# Patient Record
Sex: MALE | Race: WHITE | Employment: FULL TIME | URBAN - METROPOLITAN AREA
[De-identification: names, ages, dates, MRNs, and addresses within clinical notes are randomized per-mention and may not be internally consistent; named-entity substitution may affect disease eponyms.]

---

## 2018-06-25 LAB
CREATININE, EXTERNAL: 1.15
HBA1C MFR BLD HPLC: 5.5 %
LDL-C, EXTERNAL: 157

## 2018-10-01 ENCOUNTER — OFFICE VISIT (OUTPATIENT)
Dept: ENDOCRINOLOGY | Age: 28
End: 2018-10-01

## 2018-10-01 VITALS
WEIGHT: 195.8 LBS | HEIGHT: 67 IN | RESPIRATION RATE: 16 BRPM | BODY MASS INDEX: 30.73 KG/M2 | HEART RATE: 71 BPM | OXYGEN SATURATION: 97 % | SYSTOLIC BLOOD PRESSURE: 128 MMHG | DIASTOLIC BLOOD PRESSURE: 70 MMHG

## 2018-10-01 DIAGNOSIS — E78.00 PURE HYPERCHOLESTEROLEMIA: ICD-10-CM

## 2018-10-01 DIAGNOSIS — E78.49 FAMILIAL HYPERLIPIDEMIA, HIGH LDL: Primary | ICD-10-CM

## 2018-10-01 DIAGNOSIS — R74.01 ELEVATED ALANINE AMINOTRANSFERASE (ALT) LEVEL: ICD-10-CM

## 2018-10-01 RX ORDER — ALBUTEROL SULFATE 90 UG/1
AEROSOL, METERED RESPIRATORY (INHALATION)
COMMUNITY

## 2018-10-01 RX ORDER — MONTELUKAST SODIUM 10 MG/1
TABLET ORAL
COMMUNITY

## 2018-10-01 RX ORDER — ATORVASTATIN CALCIUM 40 MG/1
TABLET, FILM COATED ORAL
COMMUNITY
Start: 2018-05-09 | End: 2018-10-01 | Stop reason: SDUPTHER

## 2018-10-01 RX ORDER — EZETIMIBE 10 MG/1
TABLET ORAL
COMMUNITY
Start: 2018-08-27 | End: 2018-10-01 | Stop reason: SDUPTHER

## 2018-10-01 RX ORDER — BISMUTH SUBSALICYLATE 262 MG
1 TABLET,CHEWABLE ORAL DAILY
COMMUNITY

## 2018-10-01 RX ORDER — ATORVASTATIN CALCIUM 80 MG/1
80 TABLET, FILM COATED ORAL DAILY
Qty: 90 TAB | Refills: 2 | Status: SHIPPED | OUTPATIENT
Start: 2018-10-01 | End: 2019-04-17 | Stop reason: SDUPTHER

## 2018-10-01 RX ORDER — NIACIN 1000 MG
TABLET, EXTENDED RELEASE ORAL
COMMUNITY

## 2018-10-01 RX ORDER — EZETIMIBE 10 MG/1
10 TABLET ORAL DAILY
Qty: 90 TAB | Refills: 3 | Status: SHIPPED | OUTPATIENT
Start: 2018-10-01 | End: 2019-04-17 | Stop reason: SDUPTHER

## 2018-10-01 NOTE — PROGRESS NOTES
Endocrinology New Patient Visit    Chief Complaint: hyperlipidemia    History of Present Illness:  Pradip Decker is a 33 y.o. male who is self-referred for familial hyperlipidemia (type unknown). He was previously f/b Dr Ted Georges at [facility], last visit was ~5 years ago. He was unable to continue to commute to [facility] for appointments, lives closer to [place]. We are working to obtain records regarding his diagnosis and testing from Dr Aashish Silva. He denies any clinical manifestations of hyperlipidemia (xanthomas, xanthelasmas). He has a strong family history of high LDL cholesterol and premature CAD. His father had an MI and CABG at age 35, and recently underwent revision of the CABG a couple years ago (now in his early 62s). The patient does not know his initial lipid profile prior to starting treatment for high cholesterol. His current regimen is atorvastatin 40mg daily, Zetia 10mg daily, and regular niacin 1000mg daily. He was previously taking Zocor but has been on atorvastatin for 10 years. He was on 80mg daily of atorvastatin and denies side effects, but his dose was reduced by Dr Zendejas January when he started niacin. Most recent labs done in June (while on current medications) are notable for the following results: Total Chol 213  TG 37  HDL 49    TSH 1.3  ALT 47 (slightly elevated) - AST normal at 27  A1c 5.5%    He is interested in other therapies for high cholesterol including Repatha, which his father is taking. He denies any h/o CAD or CVA. Has not had a coronary calcium score or liver US. Overall feels well and has no complaints today. Able to tolerate exercise without CP or JARAMILLO. Denies myalgias but does have flushing on occasion from niacin. Works for [employer], lives in [facility]. His wife is an NP. Review of Systems: as above, otherwise a 10 pt review is negative     Problem List:  There is no problem list on file for this patient.       Past Medical History:  Past Medical History: Diagnosis Date    Hyperlipidemia        Past Surgical History:  History reviewed. No pertinent surgical history. Social History:  Social History     Social History    Marital status:      Spouse name: N/A    Number of children: N/A    Years of education: N/A     Occupational History    Not on file. Social History Main Topics    Smoking status: Never Smoker    Smokeless tobacco: Never Used    Alcohol use 1.2 oz/week     2 Glasses of wine per week      Comment: rare    Drug use: Not on file    Sexual activity: Not on file     Other Topics Concern    Not on file     Social History Narrative    No narrative on file       Family History:  No family history on file. Medications:     Current Outpatient Prescriptions:     atorvastatin (LIPITOR) 40 mg tablet, , Disp: , Rfl:     ezetimibe (ZETIA) 10 mg tablet, , Disp: , Rfl:     montelukast (SINGULAIR) 10 mg tablet, montelukast 10 mg tablet, Disp: , Rfl:     docosahexanoic acid/epa (FISH OIL PO), Take  by mouth., Disp: , Rfl:     ubidecarenone (ULTRA COQ10 PO), Take  by mouth., Disp: , Rfl:     multivitamin (ONE A DAY) tablet, Take 1 Tab by mouth daily. , Disp: , Rfl:     BABY ASPIRIN PO, Take  by mouth., Disp: , Rfl:     niacin 1,000 mg TbER, Take  by mouth., Disp: , Rfl:     albuterol (PROVENTIL HFA, VENTOLIN HFA, PROAIR HFA) 90 mcg/actuation inhaler, Ventolin HFA 90 mcg/actuation aerosol inhaler, Disp: , Rfl:     Allergies:  No Known Allergies    Physical Examination:  Visit Vitals    /70    Pulse 71    Resp 16    Ht 5' 7\" (1.702 m)    Wt 195 lb 12.8 oz (88.8 kg)    SpO2 97%    BMI 30.67 kg/m2       Gen: no acute distress  HEENT: mucous membranes moist, no corneal abnormalities  Thyroid: no enlargement or nodules noted  CAD: normal rate, regular rhythm.  No murmur rubs or gallops  PULM: clear to ausculation, no wheezes, rhonchis or rales  EXT: no clubbing, cyanosis or edema, no tendon xanthomas   Neuro: grossly non focal, normal DTRs  Psych: pleasant, good insight into medical hx    Clinical Data Review: There are no results available in [facility]. Pertinent lab results from outside records are transcribed in HPI and scanned into the medical record. Assessment and Plan:  Basilio Decker is a 33 y.o. male here for familial hyperlipidemia, type unknown - awaiting [facility] records to help clarify. He does not have known CVD, but we may pursue coronary calcium testing in the future. We discussed that his LDL goal is at least below 150 (but ideally less than 100) for now. Will first maximize his atorvastatin dose to 80mg daily. Continue Zetia, ASA, and niacin for now. If LDL remains above 100, will recommend addition of Repatha (which would enable us to d/c niacin). Plan to monitor LFTs given his recent elevation of ALT. Since this is commonly seen in NAFLD, I am also ordering a liver ultrasound to evaluate for that. Patient verbalized an understanding and will return to clinic in 6 months. He will have CMP/lipids done Q3mo. I spent 45 minutes with the patient today and > 50% of the time was spent counseling the patient about causes, manifestations, and treatment of familial hyperlipidemia. Thank you for the opportunity to participate in this patient's care.     Khushboo Fontenot MD  [facility]  [facility]

## 2018-10-01 NOTE — MR AVS SNAPSHOT
[address] [facility] [address] [place] [address] 
[phone] Patient: Radha Cueavs Page MRN: [id number] :1990 Visit Information Date & Time Provider Department Dept. Phone Encounter #  
 10/1/2018  1:30 PM MD Harish Gonzalez[facility] [phone] Upcoming Health Maintenance Date Due DTaP/Tdap/Td series (1 - Tdap) 2010 Influenza Age 5 to Adult 2018 Allergies as of 10/1/2018  Review Complete On: 10/1/2018 By: Girma Rosenberg No Known Allergies Current Immunizations  Never Reviewed No immunizations on file. Not reviewed this visit You Were Diagnosed With   
  
 Codes Comments Pure hypercholesterolemia    -  Primary ICD-10-CM: E78.00 ICD-9-CM: 272.0 Vitals BP Pulse Resp Height(growth percentile) Weight(growth percentile) SpO2  
 128/70 71 16 5' 7\" (1.702 m) 195 lb 12.8 oz (88.8 kg) 97% BMI Smoking Status 30.67 kg/m2 Never Smoker Vitals History BMI and BSA Data Body Mass Index Body Surface Area  
 30.67 kg/m 2 2.05 m 2 Preferred Pharmacy Pharmacy Name Phone [address] [place] [address] [phone] Your Updated Medication List  
  
   
This list is accurate as of 10/1/18  2:12 PM.  Always use your most recent med list.  
  
  
  
  
 albuterol 90 mcg/actuation inhaler Commonly known as:  PROVENTIL HFA, VENTOLIN HFA, PROAIR HFA Ventolin HFA 90 mcg/actuation aerosol inhaler  
  
 atorvastatin 80 mg tablet Commonly known as:  LIPITOR Take 1 Tab by mouth daily. BABY ASPIRIN PO Take  by mouth.  
  
 ezetimibe 10 mg tablet Commonly known as:  Orpah Dux FISH OIL PO Take  by mouth.  
  
 montelukast 10 mg tablet Commonly known as:  SINGULAIR  
montelukast 10 mg tablet  
  
 multivitamin tablet Commonly known as:  ONE A DAY  
 Take 1 Tab by mouth daily. niacin 1,000 mg Tber Take  by mouth. ULTRA COQ10 PO Take  by mouth. Prescriptions Sent to Pharmacy Refills  
 atorvastatin (LIPITOR) 80 mg tablet 2 Sig: Take 1 Tab by mouth daily. Class: Normal  
 Pharmacy: [address] [place] [address] [facility] [address]  #: [phone] Route: Oral  
  
We Performed the Following LIPID PANEL [68408 CPT(R)] METABOLIC PANEL, COMPREHENSIVE [78212 CPT(R)] To-Do List   
 Around 10/01/2018 Imaging:  US ABD LTD   
  
 10/11/2018 2:30 PM  
  Appointment with Manjinder Escobedo MD; [facility] FLUORO 2 at [address] ([phone]) 1. Blood thinners and platelet inhibitors must be stopped 3-5 days prior to procedure. Consult your ordering physician prior to stopping them. 2. Arrive 30 minutes prior to schedued exam time. 10/11/2018 3:00 PM  
  Appointment with [facility] MRI 1 at [facility] ([phone]) 1. Please bring a list or a bag of your current medications to your appointment 2. Please be sure to remove ALL hair clips, pins, extensions, etc., prior to arriving for your MRI procedure. 3. If you have any medical implants or devices, please bring associated medical card with you. 4. Bring any non [facility] films or CDs pertaining to the area being imaged with you on the day of appointment. 5. A written order with a valid diagnosis and Physicians  signature is required for all scheduled tests. 6. Check in at registration 30min before your appointment time unless you were instructed to do otherwise. Around 04/01/2019 Lab:  METABOLIC PANEL, COMPREHENSIVE Around 04/01/2019 Lab:  Kennedy Hodges Introducing [facility]! [facility] [employer] introduces My Pick Box patient portal. Now you can access parts of your medical record, email your doctor's office, and request medication refills online.    
 
1. In your internet browser, go to [email or web address] 2. Click on the First Time User? Click Here link in the Sign In box. You will see the New Member Sign Up page. 3. Enter your [employer] Access Code exactly as it appears below. You will not need to use this code after youve completed the sign-up process. If you do not sign up before the expiration date, you must request a new code. · [employer] Access Code: [email or web address] Expires: 11/5/2018  4:21 PM 
 
4. Enter the last four digits of your Social Security Number (xxxx) and Date of Birth (mm/dd/yyyy) as indicated and click Submit. You will be taken to the next sign-up page. 5. Create a Talkot ID. This will be your [employer] login ID and cannot be changed, so think of one that is secure and easy to remember. 6. Create a [employer] password. You can change your password at any time. 7. Enter your Password Reset Question and Answer. This can be used at a later time if you forget your password. 8. Enter your e-mail address. You will receive e-mail notification when new information is available in [address] 9. Click Sign Up. You can now view and download portions of your medical record. 10. Click the Download Summary menu link to download a portable copy of your medical information. If you have questions, please visit the Frequently Asked Questions section of the [employer] website. Remember, [employer] is NOT to be used for urgent needs. For medical emergencies, dial 911. Now available from your iPhone and Android! Please provide this summary of care documentation to your next provider. If you have any questions after today's visit, please call [phone].

## 2018-10-02 PROBLEM — E78.49 FAMILIAL HYPERLIPIDEMIA, HIGH LDL: Status: ACTIVE | Noted: 2018-10-02

## 2018-10-02 PROBLEM — R74.01 ELEVATED ALANINE AMINOTRANSFERASE (ALT) LEVEL: Status: ACTIVE | Noted: 2018-10-02

## 2018-10-04 ENCOUNTER — HOSPITAL ENCOUNTER (OUTPATIENT)
Dept: ULTRASOUND IMAGING | Age: 28
Discharge: HOME | End: 2018-10-04
Attending: INTERNAL MEDICINE
Payer: COMMERCIAL

## 2018-10-04 DIAGNOSIS — E78.00 PURE HYPERCHOLESTEROLEMIA: ICD-10-CM

## 2018-10-04 PROCEDURE — 76705 ECHO EXAM OF ABDOMEN: CPT

## 2018-10-04 NOTE — PROGRESS NOTES
Please let him know that his ultrasound was normal. No evidence of fatty liver. He can see the report if he signs up for MyChart.

## 2018-10-05 ENCOUNTER — TELEPHONE (OUTPATIENT)
Dept: ENDOCRINOLOGY | Age: 28
End: 2018-10-05

## 2018-10-05 NOTE — TELEPHONE ENCOUNTER
----- Message from Letitia Chaudhary MD sent at 10/4/2018  3:13 PM EDT -----  Please let him know that his ultrasound was normal. No evidence of fatty liver. He can see the report if he signs up for MyChart.

## 2018-10-05 NOTE — TELEPHONE ENCOUNTER
Pt was made aware of his normal ultrasound report after verifying his name and .   Pt voiced understanding

## 2018-10-11 ENCOUNTER — HOSPITAL ENCOUNTER (OUTPATIENT)
Dept: GENERAL RADIOLOGY | Age: 28
Discharge: HOME | End: 2018-10-11
Attending: ORTHOPAEDIC SURGERY
Payer: COMMERCIAL

## 2018-10-11 ENCOUNTER — HOSPITAL ENCOUNTER (OUTPATIENT)
Dept: MRI IMAGING | Age: 28
Discharge: HOME | End: 2018-10-11
Attending: ORTHOPAEDIC SURGERY
Payer: COMMERCIAL

## 2018-10-11 DIAGNOSIS — M75.102 ROTATOR CUFF SYNDROME OF LEFT SHOULDER: ICD-10-CM

## 2018-10-11 PROCEDURE — 74011636320 HC RX REV CODE- 636/320: Performed by: RADIOLOGY

## 2018-10-11 PROCEDURE — 23350 INJECTION FOR SHOULDER X-RAY: CPT

## 2018-10-11 PROCEDURE — A9575 INJ GADOTERATE MEGLUMI 0.1ML: HCPCS | Performed by: RADIOLOGY

## 2018-10-11 PROCEDURE — 74011250636 HC RX REV CODE- 250/636: Performed by: RADIOLOGY

## 2018-10-11 PROCEDURE — 77030014113 HC TY ARTHROGRM BD -A

## 2018-10-11 PROCEDURE — 73222 MRI JOINT UPR EXTREM W/DYE: CPT

## 2018-10-11 PROCEDURE — 77030003666 HC NDL SPINAL BD -A

## 2018-10-11 RX ORDER — LIDOCAINE HYDROCHLORIDE 10 MG/ML
10 INJECTION, SOLUTION EPIDURAL; INFILTRATION; INTRACAUDAL; PERINEURAL ONCE
Status: COMPLETED | OUTPATIENT
Start: 2018-10-11 | End: 2018-10-11

## 2018-10-11 RX ORDER — GADOTERATE MEGLUMINE 376.9 MG/ML
2 INJECTION INTRAVENOUS
Status: COMPLETED | OUTPATIENT
Start: 2018-10-11 | End: 2018-10-11

## 2018-10-11 RX ADMIN — LIDOCAINE HYDROCHLORIDE 5 ML: 10 INJECTION, SOLUTION EPIDURAL; INFILTRATION; INTRACAUDAL; PERINEURAL at 15:07

## 2018-10-11 RX ADMIN — IOHEXOL 10 ML: 300 INJECTION, SOLUTION INTRAVENOUS at 15:06

## 2018-10-11 RX ADMIN — GADOTERATE MEGLUMINE 2 ML: 376.9 INJECTION INTRAVENOUS at 15:06

## 2019-04-01 DIAGNOSIS — E78.00 PURE HYPERCHOLESTEROLEMIA: ICD-10-CM

## 2019-04-17 ENCOUNTER — OFFICE VISIT (OUTPATIENT)
Dept: ENDOCRINOLOGY | Age: 29
End: 2019-04-17

## 2019-04-17 VITALS
OXYGEN SATURATION: 93 % | RESPIRATION RATE: 16 BRPM | HEIGHT: 67 IN | DIASTOLIC BLOOD PRESSURE: 76 MMHG | WEIGHT: 199.2 LBS | SYSTOLIC BLOOD PRESSURE: 126 MMHG | BODY MASS INDEX: 31.27 KG/M2 | HEART RATE: 74 BPM

## 2019-04-17 DIAGNOSIS — E78.00 PURE HYPERCHOLESTEROLEMIA: Primary | ICD-10-CM

## 2019-04-17 RX ORDER — ATORVASTATIN CALCIUM 80 MG/1
80 TABLET, FILM COATED ORAL DAILY
Qty: 90 TAB | Refills: 3 | Status: SHIPPED | OUTPATIENT
Start: 2019-04-17

## 2019-04-17 RX ORDER — EZETIMIBE 10 MG/1
10 TABLET ORAL DAILY
Qty: 90 TAB | Refills: 3 | Status: SHIPPED | OUTPATIENT
Start: 2019-04-17

## 2019-04-17 NOTE — PROGRESS NOTES
Endocrinology Visit    Chief Complaint: hyperlipidemia    History of Present Illness:  Lillian Decker is a 26 y.o. male who is returns for familial hyperlipidemia (type unknown). He was previously f/b Dr Diana Reid at [facility], last visit was ~5 years ago. He was unable to continue to commute to [facility] for appointments, lives closer to [place]. I saw him in initial consultation in October at which time I increased his atorvastatin from 40 to 80mg daily and discussed possibly adding Repatha depending on his 6 month lipid results. He has not done the bloodwork yet. In the interim he reports tolerating the atorvastatin 80mg dose and continues taking Zetia 10mg daily and regular niacin 1000mg daily. Denies myalgias but does have flushing on occasion from niacin. He denies any clinical manifestations of hyperlipidemia (xanthomas, xanthelasmas). He has a strong family history of high LDL cholesterol and premature CAD. His father had an MI and CABG at age 35, and recently underwent revision of the CABG a couple years ago (now in his early 62s). The patient does not know his initial lipid profile prior to starting treatment for high cholesterol. I do not have his [facility] records yet, although we requested them at his last visit. He was previously taking Zocor but has been on atorvastatin for 10 years. Most recent labs done in June (while on atorva 40, zetia, and niacin) are notable for the following results: Total Chol 213  TG 37  HDL 49    TSH 1.3  ALT 47 (slightly elevated) - AST normal at 27  A1c 5.5%    He is interested in other therapies for high cholesterol including Repatha, which his father is taking. He denies any h/o CAD or CVA. Has not had a coronary calcium score but I ordered a liver US at his last visit and this was normal. Overall feels well and has no complaints today. Able to tolerate exercise without CP or JARAMILLO. Works for [employer], lives in [place] [address]. His wife is an NP.     Review of Systems: as above, otherwise a 7 pt review is negative     Problem List:  Patient Active Problem List   Diagnosis Code    Pure hypercholesterolemia E78.00    Familial hyperlipidemia, high LDL E78.49    Elevated alanine aminotransferase (ALT) level R74.0       Past Medical History:    Past Medical History:   Diagnosis Date    Hyperlipidemia        Past Surgical History:  No past surgical history on file. Social History:  Social History     Socioeconomic History    Marital status:      Spouse name: Not on file    Number of children: Not on file    Years of education: Not on file    Highest education level: Not on file   Occupational History    Not on file   Social Needs    Financial resource strain: Not on file    Food insecurity:     Worry: Not on file     Inability: Not on file    Transportation needs:     Medical: Not on file     Non-medical: Not on file   Tobacco Use    Smoking status: Never Smoker    Smokeless tobacco: Never Used   Substance and Sexual Activity    Alcohol use: Yes     Alcohol/week: 1.2 oz     Types: 2 Glasses of wine per week     Comment: rare    Drug use: Not on file    Sexual activity: Not on file   Lifestyle    Physical activity:     Days per week: Not on file     Minutes per session: Not on file    Stress: Not on file   Relationships    Social connections:     Talks on phone: Not on file     Gets together: Not on file     Attends Uatsdin service: Not on file     Active member of club or organization: Not on file     Attends meetings of clubs or organizations: Not on file     Relationship status: Not on file    Intimate partner violence:     Fear of current or ex partner: Not on file     Emotionally abused: Not on file     Physically abused: Not on file     Forced sexual activity: Not on file   Other Topics Concern    Not on file   Social History Narrative    Not on file       Family History:  No family history on file.     Medications:     Current Outpatient Medications:     montelukast (SINGULAIR) 10 mg tablet, montelukast 10 mg tablet, Disp: , Rfl:     docosahexanoic acid/epa (FISH OIL PO), Take  by mouth., Disp: , Rfl:     ubidecarenone (ULTRA COQ10 PO), Take  by mouth., Disp: , Rfl:     multivitamin (ONE A DAY) tablet, Take 1 Tab by mouth daily. , Disp: , Rfl:     BABY ASPIRIN PO, Take  by mouth., Disp: , Rfl:     atorvastatin (LIPITOR) 80 mg tablet, Take 1 Tab by mouth daily. , Disp: 90 Tab, Rfl: 2    ezetimibe (ZETIA) 10 mg tablet, Take 1 Tab by mouth daily. , Disp: 90 Tab, Rfl: 3    albuterol (PROVENTIL HFA, VENTOLIN HFA, PROAIR HFA) 90 mcg/actuation inhaler, Ventolin HFA 90 mcg/actuation aerosol inhaler, Disp: , Rfl:     niacin 1,000 mg TbER, Take  by mouth., Disp: , Rfl:     Allergies:  No Known Allergies    Physical Examination:  Visit Vitals  /76   Pulse 74   Resp 16   Ht 5' 7\" (1.702 m)   Wt 199 lb 3.2 oz (90.4 kg)   SpO2 93%   BMI 31.20 kg/m²       Gen: no acute distress  HEENT: mucous membranes moist, no corneal abnormalities  Thyroid: no enlargement or nodules noted  CAD: normal rate, regular rhythm. No murmur rubs or gallops  PULM: clear to ausculation, no wheezes, rhonchis or rales  EXT: no clubbing, cyanosis or edema, no tendon xanthomas   Neuro: grossly non focal, normal DTRs  Psych: pleasant, good insight into medical hx    Abdominal US 2018  FINDINGS:   Limited right upper quadrant images of the abdomen were obtained using a curved  array transducer. RIGHT KIDNEY: measures 10.3 cm is [normal] in echogenicity with normal  corticomedullary differentiation. There is no hydronephrosis or large echogenic  calculus demonstrated. GALLBLADDER: Normal.  COMMON BILE DUCT: measures 3 mm. LIVER: Normal in echogenicity. No mass lesion is identified. Portal venous flow within normal limits. The visualized portions of the pancreas are unremarkable.      IMPRESSION:   Unremarkable right upper quadrant abdominal ultrasound.     Assessment and Plan:  Carola Decker is a 26 y.o. male here for familial hyperlipidemia, type unknown - awaiting [facility] records to help clarify. He does not have known CVD, but we may pursue coronary calcium testing in the future. On atorva 40mg daily, LDL goal was at least below 150, but I maximized his atorvastatin dose to 80mg daily to help decrease this further. Continue atorvastatin 80mg daily, Zetia, ASA, and niacin for now. Recheck fasting lipids today. If LDL remains above 100, consider addition of Repatha (which would enable us to d/c niacin). Repeat LFTs given his recent elevation of ALT. Reviewed his liver ultrasound which was normal.    Patient verbalized an understanding and will return to clinic in 6 months. I spent 15 minutes with the patient today and > 50% of the time was spent counseling the patient about causes, manifestations, and treatment of familial hyperlipidemia. Thank you for the opportunity to participate in this patient's care.     Tracee Krause MD  [facility]  [facility]

## 2019-04-21 LAB
ALBUMIN SERPL-MCNC: 4.7 G/DL (ref 3.5–5.5)
ALBUMIN/GLOB SERPL: 2 {RATIO} (ref 1.2–2.2)
ALP SERPL-CCNC: 81 IU/L (ref 39–117)
ALT SERPL-CCNC: 78 IU/L (ref 0–44)
AST SERPL-CCNC: 33 IU/L (ref 0–40)
BILIRUB SERPL-MCNC: 0.6 MG/DL (ref 0–1.2)
BUN SERPL-MCNC: 17 MG/DL (ref 6–20)
BUN/CREAT SERPL: 15 (ref 9–20)
CALCIUM SERPL-MCNC: 9.7 MG/DL (ref 8.7–10.2)
CHLORIDE SERPL-SCNC: 103 MMOL/L (ref 96–106)
CHOLEST SERPL-MCNC: 207 MG/DL (ref 100–199)
CO2 SERPL-SCNC: 24 MMOL/L (ref 20–29)
CREAT SERPL-MCNC: 1.13 MG/DL (ref 0.76–1.27)
GLOBULIN SER CALC-MCNC: 2.3 G/DL (ref 1.5–4.5)
GLUCOSE SERPL-MCNC: 86 MG/DL (ref 65–99)
HDLC SERPL-MCNC: 45 MG/DL
LDLC SERPL CALC-MCNC: 149 MG/DL (ref 0–99)
POTASSIUM SERPL-SCNC: 5.1 MMOL/L (ref 3.5–5.2)
PROT SERPL-MCNC: 7 G/DL (ref 6–8.5)
SODIUM SERPL-SCNC: 144 MMOL/L (ref 134–144)
TRIGL SERPL-MCNC: 66 MG/DL (ref 0–149)
VLDLC SERPL CALC-MCNC: 13 MG/DL (ref 5–40)

## 2019-04-26 ENCOUNTER — TELEPHONE (OUTPATIENT)
Dept: ENDOCRINOLOGY | Age: 29
End: 2019-04-26

## 2019-04-26 NOTE — TELEPHONE ENCOUNTER
You can share his results with him. I tried to send them to him on Ahaalihart but he has not signed up. I will send a letter.

## 2020-07-27 ENCOUNTER — TELEPHONE (OUTPATIENT)
Dept: ENT CLINIC | Age: 30
End: 2020-07-27

## 2020-08-18 ENCOUNTER — TELEPHONE (OUTPATIENT)
Dept: ENT CLINIC | Age: 30
End: 2020-08-18

## 2020-08-26 ENCOUNTER — TELEPHONE (OUTPATIENT)
Dept: ENT CLINIC | Age: 30
End: 2020-08-26

## 2020-08-26 NOTE — TELEPHONE ENCOUNTER
Left detailed msg on his vmail.   I have spoken to the clinic in [place] and was not totally clear on what they needed from me

## 2020-11-13 ENCOUNTER — TELEPHONE (OUTPATIENT)
Dept: ENT CLINIC | Age: 30
End: 2020-11-13

## 2020-11-14 PROBLEM — J34.3 HYPERTROPHY OF NASAL TURBINATES: Status: ACTIVE | Noted: 2020-11-14

## 2020-11-14 PROBLEM — G47.33 OBSTRUCTIVE SLEEP APNEA SYNDROME: Status: ACTIVE | Noted: 2020-11-14

## 2020-11-14 PROBLEM — J30.1 ALLERGIC RHINITIS DUE TO POLLEN: Status: ACTIVE | Noted: 2020-11-14

## 2020-11-14 RX ORDER — DICLOFENAC SODIUM 75 MG/1
TABLET, DELAYED RELEASE ORAL
COMMUNITY

## 2020-11-14 RX ORDER — EPINEPHRINE 0.15 MG/.15ML
0.15 INJECTION SUBCUTANEOUS
COMMUNITY
End: 2021-04-23 | Stop reason: SDUPTHER

## 2020-11-14 RX ORDER — IBUPROFEN 800 MG/1
TABLET ORAL
COMMUNITY

## 2020-11-14 RX ORDER — CEFDINIR 300 MG/1
300 CAPSULE ORAL 2 TIMES DAILY
COMMUNITY
End: 2022-08-03 | Stop reason: ALTCHOICE

## 2020-11-14 RX ORDER — MELOXICAM 15 MG/1
15 TABLET ORAL DAILY
COMMUNITY

## 2020-11-14 RX ORDER — HYDROCODONE BITARTRATE AND ACETAMINOPHEN 5; 325 MG/1; MG/1
TABLET ORAL
COMMUNITY

## 2020-11-16 ENCOUNTER — TELEPHONE (OUTPATIENT)
Dept: ENT CLINIC | Age: 30
End: 2020-11-16

## 2021-01-04 ENCOUNTER — TELEPHONE (OUTPATIENT)
Dept: ENT CLINIC | Age: 31
End: 2021-01-04

## 2021-01-06 NOTE — TELEPHONE ENCOUNTER
Can you see when this pt is back in town and available and I can get him an appt sooner.   Pt. Needs to F/U with this issue and immunotherapy

## 2021-02-03 ENCOUNTER — OFFICE VISIT (OUTPATIENT)
Dept: ENT CLINIC | Age: 31
End: 2021-02-03
Payer: COMMERCIAL

## 2021-02-03 ENCOUNTER — TELEPHONE (OUTPATIENT)
Dept: ENT CLINIC | Age: 31
End: 2021-02-03

## 2021-02-03 VITALS
SYSTOLIC BLOOD PRESSURE: 122 MMHG | TEMPERATURE: 98.2 F | RESPIRATION RATE: 18 BRPM | HEART RATE: 74 BPM | HEIGHT: 67 IN | DIASTOLIC BLOOD PRESSURE: 82 MMHG | OXYGEN SATURATION: 100 % | BODY MASS INDEX: 28.72 KG/M2 | WEIGHT: 183 LBS

## 2021-02-03 DIAGNOSIS — J30.9 ALLERGIC RHINITIS, UNSPECIFIED SEASONALITY, UNSPECIFIED TRIGGER: Primary | ICD-10-CM

## 2021-02-03 DIAGNOSIS — J34.2 DNS (DEVIATED NASAL SEPTUM): ICD-10-CM

## 2021-02-03 PROCEDURE — 99214 OFFICE O/P EST MOD 30 MIN: CPT | Performed by: OTOLARYNGOLOGY

## 2021-02-03 NOTE — PROGRESS NOTES
Subjective:    Jerry Se Page   31 y.o.   1990     Followup Visit    Location - nose, sinus    Quality - allergic rhinitis    Severity -  Mild/moderate    Duration - years    Timing - chronic    Context - pt following up on IT; had some breaks while away in [place], but has been doing well overall not taking much antihistamine; he does complain of having occ folliculitis in nares, using bacitracin    Modifying Features - shots helping    Associated symptoms/signs - as above      Review of Systems  Review of Systems   Constitutional: Negative for chills and fever. HENT: Positive for congestion. Negative for ear pain, hearing loss, nosebleeds and tinnitus. Eyes: Negative for blurred vision and double vision. Respiratory: Negative for cough, sputum production and shortness of breath. Cardiovascular: Negative for chest pain and palpitations. Gastrointestinal: Negative for heartburn, nausea and vomiting. Musculoskeletal: Negative for joint pain and neck pain. Skin: Negative. Neurological: Negative for dizziness, speech change, weakness and headaches. Endo/Heme/Allergies: Positive for environmental allergies. Does not bruise/bleed easily. Psychiatric/Behavioral: Negative for memory loss. The patient does not have insomnia. Past Medical History:   Diagnosis Date    Allergic rhinitis due to pollen 11/14/2020    Hyperlipidemia     Hypertrophy of nasal turbinates 11/14/2020    Obstructive sleep apnea syndrome 11/14/2020     Prior to Admission medications    Medication Sig Start Date End Date Taking? Authorizing Provider   cefdinir (OMNICEF) 300 mg capsule Take 300 mg by mouth two (2) times a day. Provider, Historical   loratadine (CLARITIN PO) Take  by mouth. Provider, Historical   diclofenac EC (VOLTAREN) 75 mg EC tablet Take  by mouth. Provider, Historical   EPINEPHrine (AUVI-Q) 0.15 mg/0.15 mL injection 0.15 mg by IntraMUSCular route once as needed.     Provider, Historical   HYDROcodone-acetaminophen (NORCO) 5-325 mg per tablet Take  by mouth. Provider, Historical   ibuprofen (MOTRIN) 800 mg tablet Take  by mouth. Provider, Historical   meloxicam (MOBIC) 15 mg tablet Take 15 mg by mouth daily. Provider, Historical   ezetimibe (ZETIA) 10 mg tablet Take 1 Tab by mouth daily. 4/17/19   Noelle Lennox, MD   atorvastatin (LIPITOR) 80 mg tablet Take 1 Tab by mouth daily. 4/17/19   Noelle Lennox, MD   albuterol (PROVENTIL HFA, VENTOLIN HFA, PROAIR HFA) 90 mcg/actuation inhaler Ventolin HFA 90 mcg/actuation aerosol inhaler    Provider, Historical   montelukast (SINGULAIR) 10 mg tablet montelukast 10 mg tablet    Provider, Historical   docosahexanoic acid/epa (FISH OIL PO) Take  by mouth. Provider, Historical   ubidecarenone (ULTRA COQ10 PO) Take  by mouth. Provider, Historical   multivitamin (ONE A DAY) tablet Take 1 Tab by mouth daily. Provider, Historical   BABY ASPIRIN PO Take  by mouth. Provider, Historical   niacin 1,000 mg TbER Take  by mouth. Provider, Historical            Objective:     Visit Vitals  /82 (BP 1 Location: Left upper arm, BP Patient Position: Sitting)   Pulse 74   Temp 98.2 °F (36.8 °C) (Oral)   Resp 18   Ht 5' 7\" (1.702 m)   Wt 183 lb (83 kg)   SpO2 100%   BMI 28.66 kg/m²        Physical Exam  Vitals signs reviewed. Constitutional:       General: He is awake. Appearance: Normal appearance. He is normal weight. HENT:      Head: Normocephalic and atraumatic. Jaw: There is normal jaw occlusion. No trismus, tenderness or malocclusion. Salivary Glands: Right salivary gland is not diffusely enlarged or tender. Left salivary gland is not diffusely enlarged or tender. Right Ear: Hearing, tympanic membrane, ear canal and external ear normal.      Left Ear: Hearing, tympanic membrane, ear canal and external ear normal.      Ears:      Bartlett exam findings: does not lateralize. Right Rinne: AC > BC.      Left Rinne: AC > BC. Nose: Mucosal edema present. No septal deviation or rhinorrhea. Right Turbinates: Not enlarged, swollen or pale. Left Turbinates: Not enlarged, swollen or pale. Right Sinus: No maxillary sinus tenderness or frontal sinus tenderness. Left Sinus: No maxillary sinus tenderness or frontal sinus tenderness. Mouth/Throat:      Lips: Pink. Mouth: Mucous membranes are moist. No oral lesions. Dentition: Normal dentition. No gum lesions. Tongue: No lesions. Palate: No mass and lesions. Pharynx: Oropharynx is clear. Uvula midline. Tonsils: No tonsillar exudate. 0 on the right. 0 on the left. Eyes:      General: Vision grossly intact. Extraocular Movements: Extraocular movements intact. Right eye: No nystagmus. Left eye: No nystagmus. Pupils: Pupils are equal, round, and reactive to light. Neck:      Musculoskeletal: Normal range of motion. No edema or erythema. Thyroid: No thyroid mass, thyromegaly or thyroid tenderness. Trachea: Trachea and phonation normal. No tracheal tenderness. Cardiovascular:      Rate and Rhythm: Normal rate and regular rhythm. Pulmonary:      Effort: Pulmonary effort is normal.      Breath sounds: Normal breath sounds. No stridor. No wheezing. Musculoskeletal: Normal range of motion. Lymphadenopathy:      Cervical: No cervical adenopathy. Skin:     General: Skin is warm and dry. Neurological:      General: No focal deficit present. Mental Status: He is alert and oriented to person, place, and time. Mental status is at baseline. Cranial Nerves: Cranial nerves are intact. Coordination: Romberg sign negative. Gait: Gait is intact. Comments: Negative Hallpike   Psychiatric:         Mood and Affect: Mood normal.         Behavior: Behavior normal. Behavior is cooperative. Assessment/Plan:     Encounter Diagnoses   Name Primary?     Allergic rhinitis, unspecified seasonality, unspecified trigger Yes    DNS (deviated nasal septum)      Overall doing well  Will continue to escalate to .50mL then stay there for 10 weeks  Then consider doing biweekly    No orders of the defined types were placed in this encounter. Follow-up and Dispositions    · Return in about 3 months (around 5/3/2021).

## 2021-04-16 ENCOUNTER — TELEPHONE (OUTPATIENT)
Dept: ENT CLINIC | Age: 31
End: 2021-04-16

## 2021-04-23 ENCOUNTER — TELEPHONE (OUTPATIENT)
Dept: ENT CLINIC | Age: 31
End: 2021-04-23

## 2021-04-23 ENCOUNTER — OFFICE VISIT (OUTPATIENT)
Dept: ENT CLINIC | Age: 31
End: 2021-04-23
Payer: COMMERCIAL

## 2021-04-23 VITALS
SYSTOLIC BLOOD PRESSURE: 120 MMHG | RESPIRATION RATE: 18 BRPM | OXYGEN SATURATION: 98 % | HEART RATE: 70 BPM | BODY MASS INDEX: 28.72 KG/M2 | WEIGHT: 183 LBS | HEIGHT: 67 IN | TEMPERATURE: 98.7 F | DIASTOLIC BLOOD PRESSURE: 80 MMHG

## 2021-04-23 DIAGNOSIS — J30.9 ALLERGIC RHINITIS, UNSPECIFIED SEASONALITY, UNSPECIFIED TRIGGER: Primary | ICD-10-CM

## 2021-04-23 PROCEDURE — 95115 IMMUNOTHERAPY ONE INJECTION: CPT | Performed by: OTOLARYNGOLOGY

## 2021-04-23 RX ORDER — EPINEPHRINE 0.15 MG/.15ML
0.15 INJECTION SUBCUTANEOUS
Qty: 0.15 ML | Refills: 1 | Status: SHIPPED | OUTPATIENT
Start: 2021-04-23 | End: 2021-07-13 | Stop reason: SDUPTHER

## 2021-05-14 ENCOUNTER — TELEPHONE (OUTPATIENT)
Dept: ENT CLINIC | Age: 31
End: 2021-05-14

## 2021-05-14 DIAGNOSIS — J30.9 ALLERGIC RHINITIS, UNSPECIFIED SEASONALITY, UNSPECIFIED TRIGGER: Primary | ICD-10-CM

## 2021-05-14 RX ORDER — EPINEPHRINE 0.3 MG/.3ML
0.3 INJECTION SUBCUTANEOUS ONCE
Status: SHIPPED | OUTPATIENT
Start: 2021-05-14 | End: 2021-05-14

## 2021-07-07 ENCOUNTER — TELEPHONE (OUTPATIENT)
Dept: ENT CLINIC | Age: 31
End: 2021-07-07

## 2021-07-13 ENCOUNTER — TELEPHONE (OUTPATIENT)
Dept: ENT CLINIC | Age: 31
End: 2021-07-13

## 2021-07-13 DIAGNOSIS — J30.9 ALLERGIC RHINITIS, UNSPECIFIED SEASONALITY, UNSPECIFIED TRIGGER: Primary | ICD-10-CM

## 2021-07-13 DIAGNOSIS — Z29.8 IMMUNOTHERAPY: ICD-10-CM

## 2021-07-13 DIAGNOSIS — Z23 ENCOUNTER FOR IMMUNIZATION: Primary | ICD-10-CM

## 2021-07-13 DIAGNOSIS — J30.9 ALLERGIC RHINITIS, UNSPECIFIED SEASONALITY, UNSPECIFIED TRIGGER: ICD-10-CM

## 2021-07-13 RX ORDER — PEN NEEDLE, DIABETIC 29 G X1/2"
NEEDLE, DISPOSABLE MISCELLANEOUS
Qty: 25 PEN NEEDLE | Refills: 3 | Status: SHIPPED | OUTPATIENT
Start: 2021-07-13 | End: 2022-06-27 | Stop reason: SDUPTHER

## 2021-07-13 RX ORDER — EPINEPHRINE 0.15 MG/.15ML
0.15 INJECTION SUBCUTANEOUS
Qty: 0.15 ML | Refills: 1 | Status: SHIPPED | OUTPATIENT
Start: 2021-07-13 | End: 2021-07-13

## 2021-07-13 RX ORDER — EPINEPHRINE 0.3 MG/.3ML
INJECTION SUBCUTANEOUS
COMMUNITY

## 2021-07-13 NOTE — TELEPHONE ENCOUNTER
Pt is requesting again for his epi-pen and a refill of syringes for his self-administered allergy shots.

## 2021-07-19 ENCOUNTER — OFFICE VISIT (OUTPATIENT)
Dept: ENT CLINIC | Age: 31
End: 2021-07-19
Payer: COMMERCIAL

## 2021-07-19 DIAGNOSIS — J30.9 ALLERGIC RHINITIS, UNSPECIFIED SEASONALITY, UNSPECIFIED TRIGGER: Primary | ICD-10-CM

## 2021-07-19 PROCEDURE — 95115 IMMUNOTHERAPY ONE INJECTION: CPT | Performed by: OTOLARYNGOLOGY

## 2021-08-09 ENCOUNTER — OFFICE VISIT (OUTPATIENT)
Dept: ENT CLINIC | Age: 31
End: 2021-08-09
Payer: COMMERCIAL

## 2021-08-09 VITALS
HEART RATE: 129 BPM | SYSTOLIC BLOOD PRESSURE: 130 MMHG | TEMPERATURE: 97.4 F | WEIGHT: 183 LBS | OXYGEN SATURATION: 98 % | BODY MASS INDEX: 28.72 KG/M2 | RESPIRATION RATE: 18 BRPM | HEIGHT: 67 IN | DIASTOLIC BLOOD PRESSURE: 80 MMHG

## 2021-08-09 DIAGNOSIS — J30.1 ALLERGIC RHINITIS DUE TO POLLEN, UNSPECIFIED SEASONALITY: Primary | ICD-10-CM

## 2021-08-09 PROCEDURE — 99213 OFFICE O/P EST LOW 20 MIN: CPT | Performed by: OTOLARYNGOLOGY

## 2021-08-09 NOTE — PROGRESS NOTES
Visit Vitals  /80 (BP 1 Location: Left upper arm, BP Patient Position: Sitting, BP Cuff Size: Adult)   Pulse (!) 129   Temp 97.4 °F (36.3 °C) (Temporal)   Resp 18   Ht 5' 7\" (1.702 m)   Wt 183 lb (83 kg)   SpO2 98%   BMI 28.66 kg/m²     Chief Complaint   Patient presents with    Follow-up     allergy F/U

## 2021-08-09 NOTE — PROGRESS NOTES
Subjective:    Nick Kohli Page   27 y.o.   1990     Followup Visit    Initial HPI  Location - nose, sinus    Quality - allergic rhinitis    Severity -  Mild/moderate    Duration - years    Timing - chronic    Context - pt following up on IT; had some breaks while away in [place], but has been doing well overall not taking much antihistamine; he does complain of having occ folliculitis in nares, using bacitracin    Modifying Features - shots helping    Associated symptoms/signs - as above    Followup HPI    8/9/21 -6-month follow-up allergic rhinitis, patient has been doing immunotherapy at home. Overall doing well. He has been on full dose of concentrate now for over 10 weeks. No real breakthrough symptoms, no local reactions. He does state a couple hours after the shot he may feel little bit tight in the chest and have a cough but there is no shortness of breath nor overt wheezing    Review of Systems  Review of Systems   Constitutional: Negative for chills and fever. HENT: Positive for congestion. Negative for ear pain, hearing loss, nosebleeds and tinnitus. Eyes: Negative for blurred vision and double vision. Respiratory: Negative for cough, sputum production and shortness of breath. Cardiovascular: Negative for chest pain and palpitations. Gastrointestinal: Negative for heartburn, nausea and vomiting. Musculoskeletal: Negative for joint pain and neck pain. Skin: Negative. Neurological: Negative for dizziness, speech change, weakness and headaches. Endo/Heme/Allergies: Positive for environmental allergies. Does not bruise/bleed easily. Psychiatric/Behavioral: Negative for memory loss. The patient does not have insomnia.           Past Medical History:   Diagnosis Date    Allergic rhinitis due to pollen 11/14/2020    Hyperlipidemia     Hypertrophy of nasal turbinates 11/14/2020    Obstructive sleep apnea syndrome 11/14/2020     Prior to Admission medications    Medication Sig Start Date End Date Taking? Authorizing Provider   EPINEPHrine (EPIPEN) 0.3 mg/0.3 mL injection epinephrine 0.3 mg/0.3 mL injection, auto-injector    Provider, Historical   Syringe with Needle, Disp, (Allergy Syringe) 1 mL 27 x 1/2\" syrg Administer subq every week . 50ml from vial. 7/13/21   Caio Gudino MD   cefdinir (OMNICEF) 300 mg capsule Take 300 mg by mouth two (2) times a day. Provider, Historical   loratadine (CLARITIN PO) Take  by mouth. Provider, Historical   diclofenac EC (VOLTAREN) 75 mg EC tablet Take  by mouth. Provider, Historical   HYDROcodone-acetaminophen (NORCO) 5-325 mg per tablet Take  by mouth. Provider, Historical   ibuprofen (MOTRIN) 800 mg tablet Take  by mouth. Provider, Historical   meloxicam (MOBIC) 15 mg tablet Take 15 mg by mouth daily. Provider, Historical   ezetimibe (ZETIA) 10 mg tablet Take 1 Tab by mouth daily. 4/17/19   Ortiz Fritz MD   atorvastatin (LIPITOR) 80 mg tablet Take 1 Tab by mouth daily. 4/17/19   Ortiz Fritz MD   albuterol (PROVENTIL HFA, VENTOLIN HFA, PROAIR HFA) 90 mcg/actuation inhaler Ventolin HFA 90 mcg/actuation aerosol inhaler    Provider, Historical   montelukast (SINGULAIR) 10 mg tablet montelukast 10 mg tablet    Provider, Historical   docosahexanoic acid/epa (FISH OIL PO) Take  by mouth. Provider, Historical   ubidecarenone (ULTRA COQ10 PO) Take  by mouth. Provider, Historical   multivitamin (ONE A DAY) tablet Take 1 Tab by mouth daily. Provider, Historical   BABY ASPIRIN PO Take  by mouth. Provider, Historical   niacin 1,000 mg TbER Take  by mouth. Provider, Historical            Objective:     Visit Vitals  /80 (BP 1 Location: Left upper arm, BP Patient Position: Sitting, BP Cuff Size: Adult)   Pulse (!) 129   Temp 97.4 °F (36.3 °C) (Temporal)   Resp 18   Ht 5' 7\" (1.702 m)   Wt 183 lb (83 kg)   SpO2 98%   BMI 28.66 kg/m²        Physical Exam  Vitals reviewed. Constitutional:       General: He is awake. Appearance: Normal appearance. He is normal weight. HENT:      Head: Normocephalic and atraumatic. Jaw: There is normal jaw occlusion. No trismus, tenderness or malocclusion. Salivary Glands: Right salivary gland is not diffusely enlarged or tender. Left salivary gland is not diffusely enlarged or tender. Right Ear: Hearing, tympanic membrane, ear canal and external ear normal.      Left Ear: Hearing, tympanic membrane, ear canal and external ear normal.      Ears:      Bartlett exam findings: does not lateralize. Right Rinne: AC > BC. Left Rinne: AC > BC. Nose: Mucosal edema present. No septal deviation or rhinorrhea. Right Turbinates: Not enlarged, swollen or pale. Left Turbinates: Not enlarged, swollen or pale. Right Sinus: No maxillary sinus tenderness or frontal sinus tenderness. Left Sinus: No maxillary sinus tenderness or frontal sinus tenderness. Mouth/Throat:      Lips: Pink. Mouth: Mucous membranes are moist. No oral lesions. Dentition: Normal dentition. No gum lesions. Tongue: No lesions. Palate: No mass and lesions. Pharynx: Oropharynx is clear. Uvula midline. Tonsils: No tonsillar exudate. 0 on the right. 0 on the left. Eyes:      General: Vision grossly intact. Extraocular Movements: Extraocular movements intact. Right eye: No nystagmus. Left eye: No nystagmus. Pupils: Pupils are equal, round, and reactive to light. Neck:      Thyroid: No thyroid mass, thyromegaly or thyroid tenderness. Trachea: Trachea and phonation normal. No tracheal tenderness. Cardiovascular:      Rate and Rhythm: Normal rate and regular rhythm. Pulmonary:      Effort: Pulmonary effort is normal.      Breath sounds: Normal breath sounds. No stridor. No wheezing. Musculoskeletal:         General: Normal range of motion. Cervical back: Normal range of motion. No edema or erythema.    Lymphadenopathy:      Cervical: No cervical adenopathy. Skin:     General: Skin is warm and dry. Neurological:      General: No focal deficit present. Mental Status: He is alert and oriented to person, place, and time. Mental status is at baseline. Cranial Nerves: Cranial nerves are intact. Coordination: Romberg sign negative. Gait: Gait is intact. Psychiatric:         Mood and Affect: Mood normal.         Behavior: Behavior normal. Behavior is cooperative. Assessment/Plan:     Encounter Diagnoses   Name Primary?  Allergic rhinitis due to pollen, unspecified seasonality Yes     Reviewed his allergy worksheets. Since he is mostly allergic to grass I would recommend he continue with weekly immunotherapy for the remainder of the summer season but starting early October he can transition to biweekly immunotherapy. Follow back up in 6 months    No orders of the defined types were placed in this encounter. Follow-up and Dispositions    · Return in about 6 months (around 2/9/2022).

## 2021-09-21 ENCOUNTER — TELEPHONE (OUTPATIENT)
Dept: ENT CLINIC | Age: 31
End: 2021-09-21

## 2021-09-29 ENCOUNTER — TELEPHONE (OUTPATIENT)
Dept: ENT CLINIC | Age: 31
End: 2021-09-29

## 2021-09-29 NOTE — TELEPHONE ENCOUNTER
PC to patient, [facility] allergy serum ready.   to call office for appt for vial test. [facility]

## 2021-10-05 ENCOUNTER — OFFICE VISIT (OUTPATIENT)
Dept: ENT CLINIC | Age: 31
End: 2021-10-05
Payer: COMMERCIAL

## 2021-10-05 VITALS — SYSTOLIC BLOOD PRESSURE: 128 MMHG | HEART RATE: 64 BPM | DIASTOLIC BLOOD PRESSURE: 76 MMHG | OXYGEN SATURATION: 99 %

## 2021-10-05 DIAGNOSIS — J30.89 ALLERGIC RHINITIS DUE TO OTHER ALLERGIC TRIGGER, UNSPECIFIED SEASONALITY: Primary | ICD-10-CM

## 2021-10-05 PROCEDURE — 95115 IMMUNOTHERAPY ONE INJECTION: CPT | Performed by: NURSE PRACTITIONER

## 2022-01-17 ENCOUNTER — TELEPHONE (OUTPATIENT)
Dept: ENT CLINIC | Age: 32
End: 2022-01-17

## 2022-01-28 ENCOUNTER — OFFICE VISIT (OUTPATIENT)
Dept: ENT CLINIC | Age: 32
End: 2022-01-28
Payer: COMMERCIAL

## 2022-01-28 DIAGNOSIS — J30.9 ALLERGIC RHINITIS, UNSPECIFIED SEASONALITY, UNSPECIFIED TRIGGER: Primary | ICD-10-CM

## 2022-01-28 PROCEDURE — 95115 IMMUNOTHERAPY ONE INJECTION: CPT | Performed by: NURSE PRACTITIONER

## 2022-03-19 PROBLEM — G47.33 OBSTRUCTIVE SLEEP APNEA SYNDROME: Status: ACTIVE | Noted: 2020-11-14

## 2022-03-19 PROBLEM — R74.01 ELEVATED ALANINE AMINOTRANSFERASE (ALT) LEVEL: Status: ACTIVE | Noted: 2018-10-02

## 2022-03-19 PROBLEM — E78.49 FAMILIAL HYPERLIPIDEMIA, HIGH LDL: Status: ACTIVE | Noted: 2018-10-02

## 2022-03-19 PROBLEM — E78.00 PURE HYPERCHOLESTEROLEMIA: Status: ACTIVE | Noted: 2018-10-01

## 2022-03-19 PROBLEM — J30.1 ALLERGIC RHINITIS DUE TO POLLEN: Status: ACTIVE | Noted: 2020-11-14

## 2022-03-19 PROBLEM — J34.3 HYPERTROPHY OF NASAL TURBINATES: Status: ACTIVE | Noted: 2020-11-14

## 2022-06-02 ENCOUNTER — TELEPHONE (OUTPATIENT)
Dept: ENT CLINIC | Age: 32
End: 2022-06-02

## 2022-06-03 ENCOUNTER — TELEPHONE (OUTPATIENT)
Dept: ENT CLINIC | Age: 32
End: 2022-06-03

## 2022-06-03 NOTE — TELEPHONE ENCOUNTER
PC to patient-allergy serum is ready. , phone not accepting calls at this time, unable to leave message.  Trudy

## 2022-06-06 ENCOUNTER — TELEPHONE (OUTPATIENT)
Dept: ENT CLINIC | Age: 32
End: 2022-06-06

## 2022-06-09 ENCOUNTER — OFFICE VISIT (OUTPATIENT)
Dept: ENT CLINIC | Age: 32
End: 2022-06-09
Payer: COMMERCIAL

## 2022-06-09 VITALS — HEART RATE: 63 BPM | SYSTOLIC BLOOD PRESSURE: 132 MMHG | OXYGEN SATURATION: 99 % | DIASTOLIC BLOOD PRESSURE: 84 MMHG

## 2022-06-09 DIAGNOSIS — J30.89 ALLERGIC RHINITIS DUE TO OTHER ALLERGIC TRIGGER, UNSPECIFIED SEASONALITY: Primary | ICD-10-CM

## 2022-06-09 PROCEDURE — 95115 IMMUNOTHERAPY ONE INJECTION: CPT | Performed by: OTOLARYNGOLOGY

## 2022-06-27 ENCOUNTER — TELEPHONE (OUTPATIENT)
Dept: ENT CLINIC | Age: 32
End: 2022-06-27

## 2022-06-27 DIAGNOSIS — Z29.8 IMMUNOTHERAPY: ICD-10-CM

## 2022-06-27 RX ORDER — PEN NEEDLE, DIABETIC 29 G X1/2"
NEEDLE, DISPOSABLE MISCELLANEOUS
Qty: 25 PEN NEEDLE | Refills: 3 | Status: SHIPPED | OUTPATIENT
Start: 2022-06-27

## 2022-08-03 ENCOUNTER — OFFICE VISIT (OUTPATIENT)
Dept: ENT CLINIC | Age: 32
End: 2022-08-03
Payer: COMMERCIAL

## 2022-08-03 VITALS
HEART RATE: 63 BPM | OXYGEN SATURATION: 99 % | RESPIRATION RATE: 16 BRPM | WEIGHT: 183 LBS | SYSTOLIC BLOOD PRESSURE: 124 MMHG | HEIGHT: 67 IN | BODY MASS INDEX: 28.72 KG/M2 | DIASTOLIC BLOOD PRESSURE: 80 MMHG

## 2022-08-03 DIAGNOSIS — G47.33 OSA (OBSTRUCTIVE SLEEP APNEA): ICD-10-CM

## 2022-08-03 DIAGNOSIS — J30.89 ALLERGIC RHINITIS DUE TO OTHER ALLERGIC TRIGGER, UNSPECIFIED SEASONALITY: Primary | ICD-10-CM

## 2022-08-03 PROCEDURE — 99214 OFFICE O/P EST MOD 30 MIN: CPT | Performed by: OTOLARYNGOLOGY

## 2022-08-03 NOTE — PROGRESS NOTES
Subjective:    Rachel Blue Page   34 y.o.   1990     Followup Visit    Initial HPI  Location - nose, sinus    Quality - allergic rhinitis    Severity -  Mild/moderate    Duration - years    Timing - chronic    Context - pt following up on IT; had some breaks while away in [place], but has been doing well overall not taking much antihistamine; he does complain of having occ folliculitis in nares, using bacitracin    Modifying Features - shots helping    Associated symptoms/signs - as above    Followup HPI    8/9/21 -6-month follow-up allergic rhinitis, patient has been doing immunotherapy at home. Overall doing well. He has been on full dose of concentrate now for over 10 weeks. No real breakthrough symptoms, no local reactions. He does state a couple hours after the shot he may feel little bit tight in the chest and have a cough but there is no shortness of breath nor overt wheezing    8/3/22 - 1 year fu - pt is doing well on biweekly IT since January 2022 - only uses breakthrough meds rarely; he still c/o some nasal congestion, was better right after the Celon procedure, he is on CPAP    Review of Systems  Review of Systems   Constitutional:  Negative for chills and fever. HENT:  Positive for congestion. Negative for ear pain, hearing loss, nosebleeds and tinnitus. Eyes:  Negative for blurred vision and double vision. Respiratory:  Negative for cough, sputum production and shortness of breath. Cardiovascular:  Negative for chest pain and palpitations. Gastrointestinal:  Negative for heartburn, nausea and vomiting. Musculoskeletal:  Negative for joint pain and neck pain. Skin: Negative. Neurological:  Negative for dizziness, speech change, weakness and headaches. Endo/Heme/Allergies:  Positive for environmental allergies. Does not bruise/bleed easily. Psychiatric/Behavioral:  Negative for memory loss. The patient does not have insomnia.         Past Medical History:   Diagnosis Date Allergic rhinitis due to pollen 11/14/2020    Hyperlipidemia     Hypertrophy of nasal turbinates 11/14/2020    Obstructive sleep apnea syndrome 11/14/2020     Prior to Admission medications    Medication Sig Start Date End Date Taking? Authorizing Provider   Syringe with Needle, Disp, (Allergy Syringe) 1 mL 27 x 1/2\" syrg Administer subq every week . 50ml from vial. 6/27/22  Yes Austyn Victor MD   EPINEPHrine (EPIPEN) 0.3 mg/0.3 mL injection epinephrine 0.3 mg/0.3 mL injection, auto-injector   Yes Provider, Historical   ezetimibe (ZETIA) 10 mg tablet Take 1 Tab by mouth daily. 4/17/19  Yes Mili Smith MD   atorvastatin (LIPITOR) 80 mg tablet Take 1 Tab by mouth daily. 4/17/19  Yes Mili Smith MD   niacin 1,000 mg TbER Take  by mouth. Yes Provider, Historical   loratadine (CLARITIN PO) Take  by mouth. Patient not taking: Reported on 8/3/2022    Provider, Historical   diclofenac EC (VOLTAREN) 75 mg EC tablet Take  by mouth. Patient not taking: Reported on 8/3/2022    Provider, Historical   HYDROcodone-acetaminophen (NORCO) 5-325 mg per tablet Take  by mouth. Patient not taking: Reported on 8/3/2022    Provider, Historical   ibuprofen (MOTRIN) 800 mg tablet Take  by mouth. Patient not taking: Reported on 8/3/2022    Provider, Historical   meloxicam (MOBIC) 15 mg tablet Take 15 mg by mouth daily. Patient not taking: Reported on 8/3/2022    Provider, Historical   albuterol (PROVENTIL HFA, VENTOLIN HFA, PROAIR HFA) 90 mcg/actuation inhaler Ventolin HFA 90 mcg/actuation aerosol inhaler  Patient not taking: Reported on 8/3/2022    Provider, Historical   montelukast (SINGULAIR) 10 mg tablet montelukast 10 mg tablet  Patient not taking: Reported on 8/3/2022    Provider, Historical   docosahexanoic acid/epa (FISH OIL PO) Take  by mouth. Patient not taking: Reported on 8/3/2022    Provider, Historical   ubidecarenone (ULTRA COQ10 PO) Take  by mouth.   Patient not taking: Reported on 8/3/2022    Provider, Historical   multivitamin (ONE A DAY) tablet Take 1 Tab by mouth daily. Patient not taking: Reported on 8/3/2022    Provider, Historical   BABY ASPIRIN PO Take  by mouth. Patient not taking: Reported on 8/3/2022    Provider, Historical            Objective:     Visit Vitals  /80 (BP 1 Location: Left upper arm, BP Patient Position: Sitting, BP Cuff Size: Large adult)   Pulse 63   Resp 16   Ht 5' 7\" (1.702 m)   Wt 183 lb (83 kg)   SpO2 99%   BMI 28.66 kg/m²        Physical Exam  Vitals reviewed. Constitutional:       General: He is awake. Appearance: Normal appearance. He is normal weight. HENT:      Head: Normocephalic and atraumatic. Jaw: There is normal jaw occlusion. No trismus, tenderness or malocclusion. Salivary Glands: Right salivary gland is not diffusely enlarged or tender. Left salivary gland is not diffusely enlarged or tender. Right Ear: Hearing, tympanic membrane, ear canal and external ear normal.      Left Ear: Hearing, tympanic membrane, ear canal and external ear normal.      Nose: Mucosal edema present. No septal deviation or rhinorrhea. Right Turbinates: Not enlarged, swollen or pale. Left Turbinates: Not enlarged, swollen or pale. Right Sinus: No maxillary sinus tenderness or frontal sinus tenderness. Left Sinus: No maxillary sinus tenderness or frontal sinus tenderness. Mouth/Throat:      Lips: Pink. Mouth: Mucous membranes are moist. No oral lesions. Dentition: Normal dentition. No gum lesions. Tongue: No lesions. Palate: No mass and lesions. Pharynx: Oropharynx is clear. Uvula midline. Tonsils: No tonsillar exudate. 0 on the right. 0 on the left. Eyes:      General: Vision grossly intact. Extraocular Movements: Extraocular movements intact. Right eye: No nystagmus. Left eye: No nystagmus. Pupils: Pupils are equal, round, and reactive to light.    Neck:      Thyroid: No thyroid mass, thyromegaly or thyroid tenderness. Trachea: Trachea and phonation normal. No tracheal tenderness. Cardiovascular:      Rate and Rhythm: Normal rate and regular rhythm. Pulmonary:      Effort: Pulmonary effort is normal.      Breath sounds: Normal breath sounds. No stridor. No wheezing. Musculoskeletal:         General: Normal range of motion. Cervical back: Normal range of motion. No edema or erythema. Lymphadenopathy:      Cervical: No cervical adenopathy. Skin:     General: Skin is warm and dry. Neurological:      General: No focal deficit present. Mental Status: He is alert and oriented to person, place, and time. Mental status is at baseline. Cranial Nerves: Cranial nerves are intact. Coordination: Romberg sign negative. Gait: Gait is intact. Psychiatric:         Mood and Affect: Mood normal.         Behavior: Behavior normal. Behavior is cooperative. Assessment/Plan:     Encounter Diagnoses   Name Primary? Allergic rhinitis due to other allergic trigger, unspecified seasonality Yes    KHADIJAH (obstructive sleep apnea)      Is doing well on immunotherapy. He can move forward to doing shots every 3 weeks. Continue CPAP for KHADIJAH    He feels like his nasal obstruction may be regressing back to the way it was prior to his turbinate reduction. We discussed the option to revise a office turbinate at any time, he will consider. Follow back up in 6 months    No orders of the defined types were placed in this encounter. Follow-up and Dispositions    Return in about 6 months (around 2/3/2023).

## 2022-08-03 NOTE — PROGRESS NOTES
Visit Vitals  /80 (BP 1 Location: Left upper arm, BP Patient Position: Sitting, BP Cuff Size: Large adult)   Pulse 63   Resp 16   Ht 5' 7\" (1.702 m)   Wt 183 lb (83 kg)   SpO2 99%   BMI 28.66 kg/m²     Chief Complaint   Patient presents with    Follow-up     Immunotherapy follow up

## 2022-08-03 NOTE — LETTER
8/3/2022    Patient: Jerry Decker   YOB: 1990   Date of Visit: 8/3/2022     Dawood Barros NP  [id number] [facility]  Via In Basket    Dear Dawood Barros NP,      Thank you for referring Mr. Roman Decker to [facility] [address], THRO[facility] AND [facility]ERGY CARE for evaluation. My notes for this consultation are attached. If you have questions, please do not hesitate to call me. I look forward to following your patient along with you.       Sincerely,    Jeffrey Manning MD

## 2022-12-21 ENCOUNTER — OFFICE VISIT (OUTPATIENT)
Dept: ENT CLINIC | Age: 32
End: 2022-12-21
Payer: COMMERCIAL

## 2022-12-21 VITALS — DIASTOLIC BLOOD PRESSURE: 80 MMHG | SYSTOLIC BLOOD PRESSURE: 122 MMHG | OXYGEN SATURATION: 99 % | HEART RATE: 65 BPM

## 2022-12-21 DIAGNOSIS — J30.89 ALLERGIC RHINITIS DUE TO OTHER ALLERGIC TRIGGER, UNSPECIFIED SEASONALITY: Primary | ICD-10-CM

## 2022-12-21 PROCEDURE — 95115 IMMUNOTHERAPY ONE INJECTION: CPT | Performed by: NURSE PRACTITIONER

## 2022-12-21 NOTE — PROGRESS NOTES
Administered vial test 0.02ml intradermal of vials 1 in [place].  Local [id number] wheal. [place] [place]

## 2023-02-07 ENCOUNTER — TELEPHONE (OUTPATIENT)
Dept: ENT CLINIC | Age: 33
End: 2023-02-07

## 2023-02-08 ENCOUNTER — OFFICE VISIT (OUTPATIENT)
Dept: ENT CLINIC | Age: 33
End: 2023-02-08
Payer: COMMERCIAL

## 2023-02-08 VITALS
HEART RATE: 56 BPM | OXYGEN SATURATION: 99 % | RESPIRATION RATE: 18 BRPM | WEIGHT: 183 LBS | BODY MASS INDEX: 28.72 KG/M2 | SYSTOLIC BLOOD PRESSURE: 130 MMHG | DIASTOLIC BLOOD PRESSURE: 82 MMHG | HEIGHT: 67 IN

## 2023-02-08 DIAGNOSIS — J34.3 HYPERTROPHY OF BOTH INFERIOR NASAL TURBINATES: ICD-10-CM

## 2023-02-08 DIAGNOSIS — G47.33 OSA (OBSTRUCTIVE SLEEP APNEA): ICD-10-CM

## 2023-02-08 DIAGNOSIS — J30.89 ALLERGIC RHINITIS DUE TO OTHER ALLERGIC TRIGGER, UNSPECIFIED SEASONALITY: Primary | ICD-10-CM

## 2023-02-08 PROCEDURE — 99214 OFFICE O/P EST MOD 30 MIN: CPT | Performed by: OTOLARYNGOLOGY

## 2023-02-08 RX ORDER — EVOLOCUMAB 140 MG/ML
INJECTION, SOLUTION SUBCUTANEOUS
COMMUNITY
Start: 2023-01-13

## 2023-02-08 NOTE — PROGRESS NOTES
Subjective:    Juanis Bernstein Page   34 y.o.   1990     Followup Visit    Initial HPI  Location - nose, sinus    Quality - allergic rhinitis    Severity -  Mild/moderate    Duration - years    Timing - chronic    Context - pt following up on IT; had some breaks while away in [place], but has been doing well overall not taking much antihistamine; he does complain of having occ folliculitis in nares, using bacitracin    Modifying Features - shots helping    Associated symptoms/signs - as above    Followup HPI    8/9/21 -6-month follow-up allergic rhinitis, patient has been doing immunotherapy at home. Overall doing well. He has been on full dose of concentrate now for over 10 weeks. No real breakthrough symptoms, no local reactions. He does state a couple hours after the shot he may feel little bit tight in the chest and have a cough but there is no shortness of breath nor overt wheezing    8/3/22 - 1 year fu - pt is doing well on biweekly IT since January 2022 - only uses breakthrough meds rarely; he still c/o some nasal congestion, was better right after the Celon procedure, he is on CPAP    2/8/23  6 mos fu -overall he is doing well on immunotherapy every 3 weeks no issues. Does not really use any additional medication. He still complains of intermittent nasal congestion. He still is on CPAP    Review of Systems  Review of Systems   Constitutional:  Negative for chills and fever. HENT:  Positive for congestion. Negative for ear pain, hearing loss, nosebleeds and tinnitus. Eyes:  Negative for blurred vision and double vision. Respiratory:  Negative for cough, sputum production and shortness of breath. Cardiovascular:  Negative for chest pain and palpitations. Gastrointestinal:  Negative for heartburn, nausea and vomiting. Musculoskeletal:  Negative for joint pain and neck pain. Skin: Negative. Neurological:  Negative for dizziness, speech change, weakness and headaches. Endo/Heme/Allergies:  Positive for environmental allergies. Does not bruise/bleed easily. Psychiatric/Behavioral:  Negative for memory loss. The patient does not have insomnia. Past Medical History:   Diagnosis Date    Allergic rhinitis due to pollen 11/14/2020    Hyperlipidemia     Hypertrophy of nasal turbinates 11/14/2020    Obstructive sleep apnea syndrome 11/14/2020     Prior to Admission medications    Medication Sig Start Date End Date Taking? Authorizing Provider   Repatha Syringe syringe INJECT 1ML UNDER THE SKIN EVERY 14 DAYS 1/13/23  Yes Provider, Historical   Syringe with Needle, Disp, (Allergy Syringe) 1 mL 27 x 1/2\" syrg Administer subq every week . 50ml from vial. 6/27/22  Yes Austyn Victor MD   EPINEPHrine (EPIPEN) 0.3 mg/0.3 mL injection epinephrine 0.3 mg/0.3 mL injection, auto-injector   Yes Provider, Historical   ezetimibe (ZETIA) 10 mg tablet Take 1 Tab by mouth daily. 4/17/19  Yes Khang Powell MD   atorvastatin (LIPITOR) 80 mg tablet Take 1 Tab by mouth daily. 4/17/19  Yes Khang Powell MD   niacin 1,000 mg TbER Take  by mouth. Yes Provider, Historical   loratadine (CLARITIN PO) Take  by mouth. Patient not taking: No sig reported    Provider, Historical   diclofenac EC (VOLTAREN) 75 mg EC tablet Take  by mouth. Patient not taking: No sig reported    Provider, Historical   HYDROcodone-acetaminophen (NORCO) 5-325 mg per tablet Take  by mouth. Patient not taking: No sig reported    Provider, Historical   ibuprofen (MOTRIN) 800 mg tablet Take  by mouth. Patient not taking: No sig reported    Provider, Historical   meloxicam (MOBIC) 15 mg tablet Take 15 mg by mouth daily.   Patient not taking: No sig reported    Provider, Historical   albuterol (PROVENTIL HFA, VENTOLIN HFA, PROAIR HFA) 90 mcg/actuation inhaler Ventolin HFA 90 mcg/actuation aerosol inhaler  Patient not taking: No sig reported    Provider, Historical   montelukast (SINGULAIR) 10 mg tablet montelukast 10 mg tablet  Patient not taking: No sig reported    Provider, Historical   docosahexanoic acid/epa (FISH OIL PO) Take  by mouth. Patient not taking: No sig reported    Provider, Historical   ubidecarenone (ULTRA COQ10 PO) Take  by mouth. Patient not taking: No sig reported    Provider, Historical   multivitamin (ONE A DAY) tablet Take 1 Tab by mouth daily. Patient not taking: No sig reported    Provider, Historical   BABY ASPIRIN PO Take  by mouth. Patient not taking: No sig reported    Provider, Historical            Objective:     Visit Vitals  /82 (BP 1 Location: Left upper arm, BP Patient Position: Sitting, BP Cuff Size: Adult)   Pulse (!) 56   Resp 18   Ht 5' 7\" (1.702 m)   Wt 183 lb (83 kg)   SpO2 99%   BMI 28.66 kg/m²        Physical Exam  Vitals reviewed. Constitutional:       General: He is awake. Appearance: Normal appearance. He is normal weight. HENT:      Head: Normocephalic and atraumatic. Jaw: There is normal jaw occlusion. No trismus, tenderness or malocclusion. Salivary Glands: Right salivary gland is not diffusely enlarged or tender. Left salivary gland is not diffusely enlarged or tender. Right Ear: Hearing, tympanic membrane, ear canal and external ear normal.      Left Ear: Hearing, tympanic membrane, ear canal and external ear normal.      Nose: Mucosal edema present. No septal deviation or rhinorrhea. Right Turbinates: Not enlarged, swollen or pale. Left Turbinates: Not enlarged, swollen or pale. Right Sinus: No maxillary sinus tenderness or frontal sinus tenderness. Left Sinus: No maxillary sinus tenderness or frontal sinus tenderness. Mouth/Throat:      Lips: Pink. Mouth: Mucous membranes are moist. No oral lesions. Dentition: Normal dentition. No gum lesions. Tongue: No lesions. Palate: No mass and lesions. Pharynx: Oropharynx is clear. Uvula midline.       Tonsils: No tonsillar exudate. 0 on the right. 0 on the left.   Eyes:      General: Vision grossly intact. Extraocular Movements: Extraocular movements intact. Right eye: No nystagmus. Left eye: No nystagmus. Pupils: Pupils are equal, round, and reactive to light. Neck:      Thyroid: No thyroid mass, thyromegaly or thyroid tenderness. Trachea: Trachea and phonation normal. No tracheal tenderness. Cardiovascular:      Rate and Rhythm: Normal rate and regular rhythm. Pulmonary:      Effort: Pulmonary effort is normal.      Breath sounds: Normal breath sounds. No stridor. No wheezing. Musculoskeletal:         General: Normal range of motion. Cervical back: Normal range of motion. No edema or erythema. Lymphadenopathy:      Cervical: No cervical adenopathy. Skin:     General: Skin is warm and dry. Neurological:      General: No focal deficit present. Mental Status: He is alert and oriented to person, place, and time. Mental status is at baseline. Coordination: Romberg sign negative. Gait: Gait is intact. Psychiatric:         Mood and Affect: Mood normal.         Behavior: Behavior normal. Behavior is cooperative. Assessment/Plan:     Encounter Diagnoses   Name Primary? Allergic rhinitis due to other allergic trigger, unspecified seasonality Yes    KHADIJAH (obstructive sleep apnea)     Hypertrophy of both inferior nasal turbinates      Is doing well on immunotherapy. He can move forward to doing shots every 4 weeks. We can consider retesting him this summer and deciding whether he can continue or streamline his immunotherapy. Continue CPAP for KHADIJAH. I gave him literature regarding the inspire procedure. He feels like his nasal obstruction may be regressing back to the way it was prior to his turbinate reduction. We discussed the option to revise a office turbinate at any time, he will consider.     Follow back up in 6 months    Orders Placed This Encounter    Repatha Syringe syringe       Follow-up and Dispositions    Return in about 6 months (around 8/8/2023).

## 2024-01-17 ENCOUNTER — TELEPHONE (OUTPATIENT)
Age: 34
End: 2024-01-17

## 2024-01-17 NOTE — TELEPHONE ENCOUNTER
Patient needs a F/U appt with Dr. Johnson, can you please schedule this?  Call him at [phone]. Thanks, Terese

## 2024-01-19 ENCOUNTER — NURSE ONLY (OUTPATIENT)
Age: 34
End: 2024-01-19
Payer: COMMERCIAL

## 2024-01-19 VITALS — DIASTOLIC BLOOD PRESSURE: 92 MMHG | OXYGEN SATURATION: 99 % | SYSTOLIC BLOOD PRESSURE: 132 MMHG | HEART RATE: 63 BPM

## 2024-01-19 DIAGNOSIS — J30.89 ALLERGIC RHINITIS DUE TO OTHER ALLERGIC TRIGGER, UNSPECIFIED SEASONALITY: Primary | ICD-10-CM

## 2024-01-19 PROCEDURE — 95115 IMMUNOTHERAPY ONE INJECTION: CPT | Performed by: NURSE PRACTITIONER

## 2024-02-20 ENCOUNTER — OFFICE VISIT (OUTPATIENT)
Age: 34
End: 2024-02-20
Payer: COMMERCIAL

## 2024-02-20 VITALS
HEIGHT: 67 IN | BODY MASS INDEX: 28.72 KG/M2 | SYSTOLIC BLOOD PRESSURE: 122 MMHG | DIASTOLIC BLOOD PRESSURE: 82 MMHG | OXYGEN SATURATION: 99 % | WEIGHT: 183 LBS | HEART RATE: 53 BPM

## 2024-02-20 DIAGNOSIS — G47.33 OBSTRUCTIVE SLEEP APNEA (ADULT) (PEDIATRIC): ICD-10-CM

## 2024-02-20 DIAGNOSIS — J34.3 HYPERTROPHY OF NASAL TURBINATES: ICD-10-CM

## 2024-02-20 DIAGNOSIS — J30.89 ALLERGIC RHINITIS DUE TO OTHER ALLERGIC TRIGGER, UNSPECIFIED SEASONALITY: Primary | ICD-10-CM

## 2024-02-20 DIAGNOSIS — J31.0 CHRONIC RHINITIS: ICD-10-CM

## 2024-02-20 PROCEDURE — 99214 OFFICE O/P EST MOD 30 MIN: CPT | Performed by: OTOLARYNGOLOGY

## 2024-02-20 NOTE — PROGRESS NOTES
Subjective:    Mal Hancock Page   32 y.o.   1990     Followup Visit    Initial HPI  Location - nose, sinus    Quality - allergic rhinitis    Severity -  Mild/moderate    Duration - years    Timing - chronic    Context - pt following up on IT; had some breaks while away in [place], but has been doing well overall not taking much antihistamine; he does complain of having occ folliculitis in nares, using bacitracin    Modifying Features - shots helping    Associated symptoms/signs - as above    Followup HPI    8/9/21 -6-month follow-up allergic rhinitis, patient has been doing immunotherapy at home.  Overall doing well.  He has been on full dose of concentrate now for over 10 weeks.  No real breakthrough symptoms, no local reactions.  He does state a couple hours after the shot he may feel little bit tight in the chest and have a cough but there is no shortness of breath nor overt wheezing    8/3/22 - 1 year fu - pt is doing well on biweekly IT since January 2022 - only uses breakthrough meds rarely; he still c/o some nasal congestion, was better right after the Celon procedure, he is on CPAP    2/8/23  6 mos fu -overall he is doing well on immunotherapy every 3 weeks no issues.  Does not really use any additional medication.  He still complains of intermittent nasal congestion.  He still is on CPAP    2/20/2024  1 year follow-up - pt has been doing his IT at home around once every 2 months.    Review of Systems  Review of Systems   Constitutional:  Negative for chills and fever.   HENT:  Positive for congestion. Negative for ear pain, hearing loss, nosebleeds and tinnitus.    Eyes:  Negative for blurred vision and double vision.   Respiratory:  Negative for cough, sputum production and shortness of breath.    Cardiovascular:  Negative for chest pain and palpitations.   Gastrointestinal:  Negative for heartburn, nausea and vomiting.   Musculoskeletal:  Negative for joint pain and neck pain.   Skin: Negative.